# Patient Record
Sex: FEMALE
[De-identification: names, ages, dates, MRNs, and addresses within clinical notes are randomized per-mention and may not be internally consistent; named-entity substitution may affect disease eponyms.]

---

## 2017-10-07 PROBLEM — Z00.00 ENCOUNTER FOR PREVENTIVE HEALTH EXAMINATION: Status: ACTIVE | Noted: 2017-10-07

## 2017-11-06 ENCOUNTER — APPOINTMENT (OUTPATIENT)
Dept: ENDOCRINOLOGY | Facility: CLINIC | Age: 77
End: 2017-11-06

## 2017-12-28 ENCOUNTER — APPOINTMENT (OUTPATIENT)
Dept: ORTHOPEDIC SURGERY | Facility: CLINIC | Age: 77
End: 2017-12-28
Payer: COMMERCIAL

## 2017-12-28 VITALS
BODY MASS INDEX: 21.89 KG/M2 | HEART RATE: 60 BPM | OXYGEN SATURATION: 98 % | HEIGHT: 59.5 IN | WEIGHT: 110 LBS | SYSTOLIC BLOOD PRESSURE: 130 MMHG | DIASTOLIC BLOOD PRESSURE: 60 MMHG

## 2017-12-28 PROCEDURE — 99205 OFFICE O/P NEW HI 60 MIN: CPT

## 2018-02-27 ENCOUNTER — APPOINTMENT (OUTPATIENT)
Dept: ORTHOPEDIC SURGERY | Facility: CLINIC | Age: 78
End: 2018-02-27
Payer: COMMERCIAL

## 2018-02-27 VITALS
SYSTOLIC BLOOD PRESSURE: 120 MMHG | BODY MASS INDEX: 21.4 KG/M2 | WEIGHT: 109 LBS | HEART RATE: 62 BPM | HEIGHT: 60 IN | OXYGEN SATURATION: 98 % | DIASTOLIC BLOOD PRESSURE: 60 MMHG

## 2018-02-27 PROCEDURE — 99214 OFFICE O/P EST MOD 30 MIN: CPT

## 2019-10-29 ENCOUNTER — APPOINTMENT (OUTPATIENT)
Dept: ORTHOPEDIC SURGERY | Facility: CLINIC | Age: 79
End: 2019-10-29
Payer: COMMERCIAL

## 2019-10-29 VITALS
OXYGEN SATURATION: 98 % | HEIGHT: 60 IN | SYSTOLIC BLOOD PRESSURE: 130 MMHG | HEART RATE: 92 BPM | BODY MASS INDEX: 21.01 KG/M2 | DIASTOLIC BLOOD PRESSURE: 60 MMHG | WEIGHT: 107 LBS

## 2019-10-29 DIAGNOSIS — M81.0 AGE-RELATED OSTEOPOROSIS W/OUT CURRENT PATHOLOGICAL FRACTURE: ICD-10-CM

## 2019-10-29 PROCEDURE — 99214 OFFICE O/P EST MOD 30 MIN: CPT

## 2019-10-29 RX ORDER — FLUVASTATIN 20 MG/1
20 CAPSULE ORAL
Refills: 0 | Status: ACTIVE | COMMUNITY
Start: 2019-10-29

## 2019-10-29 RX ORDER — ASPIRIN ENTERIC COATED TABLETS 81 MG 81 MG/1
81 TABLET, DELAYED RELEASE ORAL DAILY
Refills: 0 | Status: ACTIVE | COMMUNITY
Start: 2019-10-29

## 2019-10-29 RX ORDER — ATENOLOL 25 MG/1
25 TABLET ORAL
Refills: 0 | Status: ACTIVE | COMMUNITY
Start: 2019-10-29

## 2019-10-29 NOTE — CONSULT LETTER
[Dear  ___] : Dear  [unfilled], [Consult Letter:] : I had the pleasure of evaluating your patient, [unfilled]. [Please see my note below.] : Please see my note below. [Consult Closing:] : Thank you very much for allowing me to participate in the care of this patient.  If you have any questions, please do not hesitate to contact me. [Sincerely,] : Sincerely, [FreeTextEntry2] : Dr. Emmie Bradley\par [address]\[place] [address]

## 2019-10-29 NOTE — HISTORY OF PRESENT ILLNESS
[FreeTextEntry1] : This is a 1.5 year f/u visit for this 79 year old woman wondering whether she should resume medication for osteoporosis.\par Key med hx:\par 1. menopause approx age 57\par 2. pt took alendronate for "a very long time", at least 9 years; stopped in 2011 (8 years ago)\par 3. prior to this year, pt had fallen but no fx; iin 2015, pt was vacationing in [place], states the sidewalks were slippery, she fell and fractured her right elbow, had to return home and had the radial head replaced; 2018 year, pt was vacationing in [place], she tripped in the airport on the way to the taxi, fell and hit her right knee and fractured the patella, hairline\par 4. there is no parental history of hip fracture\par 5. max height: 5'2.5" now 4'11.75" lost about 2 inches\par 6. there is no hx of GC use, no kidney stones, no RA and no anorexia or bulimia\par \par DXA done 7/27/17 was rev:\par T-scores: -0.2, -1.9 at the LS and L TH\par \par Today, labs done 1/26/18 were rev:\par glucose: 92\par creatine: 0.69\par PTH/ca:25/9.8  alb: 4.2\par sed rate: 36\par 25 D: 33\par CTX: 135\par BSAP: 11.4\par SPEP: negative\par celiac screen: negative\par \par \par calcium: 600 + 300 mg (in multi)\par vitamin D: 1000 IU/day + 1000 in multi and 800 IU in calcium\par \par PMH\par MI age 57, had stent

## 2019-10-29 NOTE — ASSESSMENT
[FreeTextEntry1] : \par 79 year old woman with prior history of at least 9 years of alendronate use, but no alendronate for past 7 years, who is concerned about two recent traumatic fractures she experienced (radial head, surgery, and patella, conservative). It is probable that her fractures are unrelated to osteoporosis and in all likelihood were simply trauma related. Lab work up suggests that  there is still residual alendronate in the patient's skeleton, as CTX is still suppressed (247, prior 135) and BSAP is in low range of normal (10.9, prior 11.4). Alendronate is known to have a long skeletal retention, and the patient took nearly 10 years of medication.\par \par Plan:\par 1. pt can continue with conservative Rx; with current vitamin D  2000 IU/day\par 2. f/u labs in Sept 2020, visit thereafter